# Patient Record
Sex: FEMALE | Race: WHITE | Employment: PART TIME | URBAN - METROPOLITAN AREA
[De-identification: names, ages, dates, MRNs, and addresses within clinical notes are randomized per-mention and may not be internally consistent; named-entity substitution may affect disease eponyms.]

---

## 2017-11-13 ENCOUNTER — HOSPITAL ENCOUNTER (OUTPATIENT)
Dept: VASCULAR SURGERY | Age: 62
Discharge: HOME | End: 2017-11-13
Attending: NURSE PRACTITIONER
Payer: COMMERCIAL

## 2017-11-13 DIAGNOSIS — M25.561 RIGHT KNEE PAIN: ICD-10-CM

## 2017-11-13 DIAGNOSIS — Z92.23 HISTORY OF ESTROGEN THERAPY: ICD-10-CM

## 2017-11-13 DIAGNOSIS — I82.90 THROMBUS: ICD-10-CM

## 2017-11-13 PROCEDURE — 93971 EXTREMITY STUDY: CPT

## 2017-11-14 NOTE — PROCEDURES
Abdulkadir  *** FINAL REPORT ***    Name: Satniago Wesley  MRN: [id number]    Outpatient  : 30 Oct 1955  HIS Order #: [id number]  [id number] [facility] Visit #: [id number]  Date: 2017    TYPE OF TEST: Peripheral Venous Testing    REASON FOR TEST  Pain in limb    Right Leg:-  Deep venous thrombosis:           No  Superficial venous thrombosis:    No  Deep venous insufficiency:        No  Superficial venous insufficiency: No      INTERPRETATION/FINDINGS  PROCEDURE:  RIGHT LOWER EXTREMITY  VENOUS DUPLEX. Evaluation of lower  extremity veins with ultrasound (B-mode imaging, pulsed Doppler, color   Doppler). Includes the common femoral, deep femoral, femoral,  popliteal, posterior tibial, peroneal, and great saphenous veins. Other veins, for example the gastrocnemius and soleal veins, may also  be visualized. FINDINGS: [place] Rhody scale and color flow duplex images of the veins in the  right lower extremity demonstrate normal compressibility, spontaneous  and augmented flow profiles, and absence of filling defects throughout   the deep and superficial veins in the right lower extremity. CONCLUSION: Right lower extremity venous duplex negative for deep  venous thrombosis or thrombophlebitis. Left common femoral vein is  thrombus free. NOTE: Avascular structure seen at the right popliteal  fosa measuring 3.15 x 1.29 cm consistent with a baker's cyst. Enlarged   lymphnode seen in the right groin measuring 0.58 x 2.09 cm. ADDITIONAL COMMENTS    I have personally reviewed the data relevant to the interpretation of  this  study. TECHNOLOGIST: Vannessa Foster. Watson  Signed: 2017 9:48:06 PM    PHYSICIAN: Hailee Rudolph.  Paola Barajas MD  Signed: 2017 10:15:11 AM

## 2018-01-22 ENCOUNTER — HOSPITAL ENCOUNTER (OUTPATIENT)
Dept: MAMMOGRAPHY | Age: 63
Discharge: HOME | End: 2018-01-22
Attending: FAMILY MEDICINE
Payer: COMMERCIAL

## 2018-01-22 DIAGNOSIS — M85.80 OSTEOPENIA: ICD-10-CM

## 2018-01-22 PROCEDURE — 77080 DXA BONE DENSITY AXIAL: CPT

## 2022-08-23 ENCOUNTER — OFFICE VISIT (OUTPATIENT)
Dept: ORTHOPEDIC SURGERY | Age: 67
End: 2022-08-23
Payer: MEDICARE

## 2022-08-23 VITALS — HEIGHT: 68 IN | WEIGHT: 240 LBS | BODY MASS INDEX: 36.37 KG/M2

## 2022-08-23 DIAGNOSIS — M25.562 KNEE MENISCUS PAIN, LEFT: ICD-10-CM

## 2022-08-23 DIAGNOSIS — S83.242A TEAR OF MEDIAL MENISCUS OF LEFT KNEE, INITIAL ENCOUNTER: ICD-10-CM

## 2022-08-23 DIAGNOSIS — M25.562 CHRONIC PAIN OF LEFT KNEE: Primary | ICD-10-CM

## 2022-08-23 DIAGNOSIS — M25.462 KNEE EFFUSION, LEFT: ICD-10-CM

## 2022-08-23 DIAGNOSIS — M25.562 MEDIAL KNEE PAIN, LEFT: ICD-10-CM

## 2022-08-23 DIAGNOSIS — G89.29 CHRONIC PAIN OF LEFT KNEE: Primary | ICD-10-CM

## 2022-08-23 PROCEDURE — G8536 NO DOC ELDER MAL SCRN: HCPCS | Performed by: ORTHOPAEDIC SURGERY

## 2022-08-23 PROCEDURE — 1090F PRES/ABSN URINE INCON ASSESS: CPT | Performed by: ORTHOPAEDIC SURGERY

## 2022-08-23 PROCEDURE — 99214 OFFICE O/P EST MOD 30 MIN: CPT | Performed by: ORTHOPAEDIC SURGERY

## 2022-08-23 PROCEDURE — 3017F COLORECTAL CA SCREEN DOC REV: CPT | Performed by: ORTHOPAEDIC SURGERY

## 2022-08-23 PROCEDURE — G8427 DOCREV CUR MEDS BY ELIG CLIN: HCPCS | Performed by: ORTHOPAEDIC SURGERY

## 2022-08-23 PROCEDURE — 1101F PT FALLS ASSESS-DOCD LE1/YR: CPT | Performed by: ORTHOPAEDIC SURGERY

## 2022-08-23 PROCEDURE — 20610 DRAIN/INJ JOINT/BURSA W/O US: CPT | Performed by: ORTHOPAEDIC SURGERY

## 2022-08-23 PROCEDURE — G8399 PT W/DXA RESULTS DOCUMENT: HCPCS | Performed by: ORTHOPAEDIC SURGERY

## 2022-08-23 PROCEDURE — G8432 DEP SCR NOT DOC, RNG: HCPCS | Performed by: ORTHOPAEDIC SURGERY

## 2022-08-23 PROCEDURE — 1123F ACP DISCUSS/DSCN MKR DOCD: CPT | Performed by: ORTHOPAEDIC SURGERY

## 2022-08-23 PROCEDURE — G8419 CALC BMI OUT NRM PARAM NOF/U: HCPCS | Performed by: ORTHOPAEDIC SURGERY

## 2022-08-23 RX ORDER — BUPIVACAINE HYDROCHLORIDE 7.5 MG/ML
2 INJECTION, SOLUTION EPIDURAL; RETROBULBAR ONCE
Status: COMPLETED | OUTPATIENT
Start: 2022-08-23 | End: 2022-08-23

## 2022-08-23 RX ORDER — TRIAMCINOLONE ACETONIDE 40 MG/ML
80 INJECTION, SUSPENSION INTRA-ARTICULAR; INTRAMUSCULAR ONCE
Status: COMPLETED | OUTPATIENT
Start: 2022-08-23 | End: 2022-08-23

## 2022-08-23 RX ADMIN — TRIAMCINOLONE ACETONIDE 80 MG: 40 INJECTION, SUSPENSION INTRA-ARTICULAR; INTRAMUSCULAR at 09:29

## 2022-08-23 RX ADMIN — BUPIVACAINE HYDROCHLORIDE 15 MG: 7.5 INJECTION, SOLUTION EPIDURAL; RETROBULBAR at 09:29

## 2022-08-23 NOTE — PROGRESS NOTES
Jesus Floyd (: 1955) is a 73 y.o. female, patient, here for evaluation of the following chief complaint(s):  Knee Pain (Left, pain since 2022)       HPI:    She began having increased left knee pain 4 weeks ago. The patient reports no specific injury but does state that she started feeling a catching sensation while walking. She describes her left knee pain now is moderate to severe, sharp, dull, throbbing, aching, and intermittent. Her left knee pain does make her difficult for her to go to sleep and does wake her up from sleep. The patient states that her left knee pain continues to get worse. She reports walking, stairs, and lying in bed make her pain worse and rest, heat, ice, and elevation all makes her pain better. The patient has been taking Tylenol and using a Voltaren gel. She was not seen in the emergency room for left knee pain and reports no previous or related left knee surgery. She was seen in an Urgent Care facility prior to her visit today and they did perform x-rays on her left knee which showed no evidence of a fracture or dislocation. There is evidence of severe medial compartment osteoarthritis of the right knee with subchondral sclerosis. Allergies   Allergen Reactions    Aspirin Swelling    Sulfa (Sulfonamide Antibiotics) Hives       Current Outpatient Medications   Medication Sig    valsartan-hydrochlorothiazide (DIOVAN-HCT) 320-25 mg per tablet Take 1 Tab by mouth daily. FLUOXETINE HCL (FLUOXETINE PO) Take  by mouth.    multivitamin (ONE A DAY) tablet Take 1 Tab by mouth daily. No current facility-administered medications for this visit.        Past Medical History:   Diagnosis Date    Essential hypertension         Past Surgical History:   Procedure Laterality Date    HX BLADDER SUSPENSION      HX  SECTION         Family History   Problem Relation Age of Onset    Diabetes Mother     No Known Problems Father         Social History Socioeconomic History    Marital status:      Spouse name: Not on file    Number of children: Not on file    Years of education: Not on file    Highest education level: Not on file   Occupational History    Not on file   Tobacco Use    Smoking status: Never    Smokeless tobacco: Never   Substance and Sexual Activity    Alcohol use: No    Drug use: Not on file    Sexual activity: Not on file   Other Topics Concern    Not on file   Social History Narrative    Not on file     Social Determinants of Health     Financial Resource Strain: Not on file   Food Insecurity: Not on file   Transportation Needs: Not on file   Physical Activity: Not on file   Stress: Not on file   Social Connections: Not on file   Intimate Partner Violence: Not on file   Housing Stability: Not on file       Review of Systems   All other systems reviewed and are negative. Vitals:  Ht 5' 8\" (1.727 m)   Wt 240 lb (108.9 kg)   BMI 36.49 kg/m²    Body mass index is 36.49 kg/m². Ortho Exam     The patient is well-developed and well-nourished. The patient presents today in alert and oriented x3 with a normal mood and affect. The patient stands with a normal weightbearing line but walks with a slightly antalgic gait because of her left knee pain. Left knee is tender to palpation along the medial joint line and has moderate size effusion. The patient has positive Norma´s test and the knee is stable. There is a lack full flexion secondary to the effusion but does have full extension. There is 5/5 strength. The knee and lower extremity is neurovascularly intact. There is no erythema or warmth the skin is normal.    ASSESSMENT/PLAN:      1. Chronic pain of left knee  -     MRI KNEE LT WO CONT; Future  2. Medial knee pain, left  3. Knee meniscus pain, left  4.  Tear of medial meniscus of left knee, initial encounter  -     bupivacaine (PF) (MARCAINE) 0.75 % (7.5 mg/mL) injection 15 mg; 15 mg (2 mL), Intra artICUlar, ONCE, 1 dose, On Tue 8/23/22 at 1000  -     triamcinolone acetonide (KENALOG-40) 40 mg/mL injection 80 mg; 80 mg, Intra artICUlar, ONCE, 1 dose, On Tue 8/23/22 at 1000  5. Knee effusion, left     Below is the assessment and plan developed based on review of pertinent history, physical exam, labs, studies, and medications. We discussed the patient's left knee pain and her signs, symptoms, physical exam, description of her pain, and x-rays performed at an outside facility that were independently reviewed today are consistent with a medial meniscus tear and an effusion. The possible treatment options were discussed with the patient and because of an effusion present we elected to aspirate and inject her left knee with cortisone today to try and alleviate some of her discomfort. The risks and benefits of the aspiration and injection were discussed in detail with the patient and under sterile prep the patient's left knee was aspirated of approximately 40 ccs of straw-colored synovial fluid and injected with 2 ccs of 40 mg/cc of Kenalog and 2 ccs of 0.75% Sensorcaine. She tolerated both the aspiration and injection well. Also because of the over 4-week long duration of her increased pain, no improvement with multiple modalities of conservative management including an at-home exercise program, her physical exam, description of her pain, independently reviewed outside x-rays, and her inability to complete daily living activities without significant discomfort we elected to obtain an MRI of her left knee to further evaluate the severity of her medial meniscus tear and causation for her effusion. The MRI images and results will be used in preoperative planning if and almost when surgical intervention is necessary. The risks and benefits of the MRI were discussed in detail with the patient and she would like to proceed. We will schedule this at her convenience.   I will see her back after MRI is complete to discuss the images, results, and further treatment options. In the interim, I did encourage her to ice and elevate when possible, modify her activity level based on her left knee pain, monitor her effusion, and use anti-inflammatory medication when necessary. The patient will also work on range of motion, strengthening, and stretching exercises with an at-home exercise program as pain tolerates. She is to avoid any deep knee bend activities against resistance, squatting, kneeling, stairs, lunging, cutting, twisting, change of direction, and high impact loading activities. I will see her back as noted above after her left knee MRI is complete. **We will obtain an MRI for more information to determine the best treatment plan moving forward and help us prepare for surgical intervention if necessary. **    Return for After her left knee MRI is complete. An electronic signature was used to authenticate this note.   -- Gela Kc MD

## 2022-08-24 ENCOUNTER — HOSPITAL ENCOUNTER (OUTPATIENT)
Dept: MRI IMAGING | Age: 67
Discharge: HOME | End: 2022-08-24
Attending: ORTHOPAEDIC SURGERY
Payer: MEDICARE

## 2022-08-24 DIAGNOSIS — M25.562 CHRONIC PAIN OF LEFT KNEE: ICD-10-CM

## 2022-08-24 DIAGNOSIS — G89.29 CHRONIC PAIN OF LEFT KNEE: ICD-10-CM

## 2022-08-24 PROCEDURE — 73721 MRI JNT OF LWR EXTRE W/O DYE: CPT

## 2022-08-26 ENCOUNTER — OFFICE VISIT (OUTPATIENT)
Dept: ORTHOPEDIC SURGERY | Age: 67
End: 2022-08-26
Payer: MEDICARE

## 2022-08-26 ENCOUNTER — OFFICE VISIT (OUTPATIENT)
Dept: ORTHOPEDIC SURGERY | Age: 67
End: 2022-08-26

## 2022-08-26 VITALS — WEIGHT: 240 LBS | BODY MASS INDEX: 36.37 KG/M2 | HEIGHT: 68 IN

## 2022-08-26 DIAGNOSIS — M25.562 CHRONIC PAIN OF LEFT KNEE: Primary | ICD-10-CM

## 2022-08-26 DIAGNOSIS — G89.29 CHRONIC PAIN OF LEFT KNEE: Primary | ICD-10-CM

## 2022-08-26 DIAGNOSIS — S83.232D COMPLEX TEAR OF MEDIAL MENISCUS OF LEFT KNEE, SUBSEQUENT ENCOUNTER: ICD-10-CM

## 2022-08-26 DIAGNOSIS — M94.262 CHONDROMALACIA OF LEFT KNEE: ICD-10-CM

## 2022-08-26 DIAGNOSIS — M17.12 PRIMARY OSTEOARTHRITIS OF LEFT KNEE: Primary | ICD-10-CM

## 2022-08-26 DIAGNOSIS — S83.272D COMPLEX TEAR OF LATERAL MENISCUS OF LEFT KNEE, SUBSEQUENT ENCOUNTER: ICD-10-CM

## 2022-08-26 PROCEDURE — 20610 DRAIN/INJ JOINT/BURSA W/O US: CPT | Performed by: ORTHOPAEDIC SURGERY

## 2022-08-26 PROCEDURE — G8399 PT W/DXA RESULTS DOCUMENT: HCPCS | Performed by: ORTHOPAEDIC SURGERY

## 2022-08-26 PROCEDURE — G8427 DOCREV CUR MEDS BY ELIG CLIN: HCPCS | Performed by: ORTHOPAEDIC SURGERY

## 2022-08-26 PROCEDURE — 1090F PRES/ABSN URINE INCON ASSESS: CPT | Performed by: ORTHOPAEDIC SURGERY

## 2022-08-26 PROCEDURE — 99213 OFFICE O/P EST LOW 20 MIN: CPT | Performed by: ORTHOPAEDIC SURGERY

## 2022-08-26 PROCEDURE — G8536 NO DOC ELDER MAL SCRN: HCPCS | Performed by: ORTHOPAEDIC SURGERY

## 2022-08-26 PROCEDURE — 1123F ACP DISCUSS/DSCN MKR DOCD: CPT | Performed by: ORTHOPAEDIC SURGERY

## 2022-08-26 PROCEDURE — G8432 DEP SCR NOT DOC, RNG: HCPCS | Performed by: ORTHOPAEDIC SURGERY

## 2022-08-26 PROCEDURE — 1101F PT FALLS ASSESS-DOCD LE1/YR: CPT | Performed by: ORTHOPAEDIC SURGERY

## 2022-08-26 PROCEDURE — 3017F COLORECTAL CA SCREEN DOC REV: CPT | Performed by: ORTHOPAEDIC SURGERY

## 2022-08-26 PROCEDURE — G8417 CALC BMI ABV UP PARAM F/U: HCPCS | Performed by: ORTHOPAEDIC SURGERY

## 2022-08-26 RX ORDER — TRIAMCINOLONE ACETONIDE 40 MG/ML
40 INJECTION, SUSPENSION INTRA-ARTICULAR; INTRAMUSCULAR ONCE
Status: COMPLETED | OUTPATIENT
Start: 2022-08-26 | End: 2022-08-26

## 2022-08-26 RX ORDER — LIDOCAINE HYDROCHLORIDE 10 MG/ML
2 INJECTION INFILTRATION; PERINEURAL ONCE
Status: COMPLETED | OUTPATIENT
Start: 2022-08-26 | End: 2022-08-26

## 2022-08-26 RX ORDER — AMLODIPINE BESYLATE 5 MG/1
TABLET ORAL
COMMUNITY
Start: 2022-07-18

## 2022-08-26 RX ADMIN — TRIAMCINOLONE ACETONIDE 40 MG: 40 INJECTION, SUSPENSION INTRA-ARTICULAR; INTRAMUSCULAR at 14:18

## 2022-08-26 RX ADMIN — LIDOCAINE HYDROCHLORIDE 2 ML: 10 INJECTION INFILTRATION; PERINEURAL at 14:18

## 2022-08-26 NOTE — PROGRESS NOTES
Bernadine Stack (: 1955) is a 73 y.o. female, patient, here for evaluation of the following chief complaint(s):  Knee Pain (Left, mri results)       HPI:    She was last seen for left knee pain on 2022. Since then, the patient did have an MRI performed on her left knee on 2022. The patient states that her pain level is the same as it was at her last visit. She rates the severity of her left knee pain as a 6 out of 10. She describes her pain as dull, aching, and constant. Her left knee pain does make it difficult for her to go to sleep and does wake her up from sleep. She has been experiencing some weakness in her left knee. The patient has been taking anti-inflammatory medication for discomfort. The patient did have her left knee injected with cortisone at her last visit and reports little to no pain relief. Left knee MRI images and results were independently reviewed and they were consistent with complex tearing the posterior horn of the posterior body of the medial meniscus with loss of meniscal hoop tensile integrity. Complex tearing of the body of the lateral meniscus with loss of meniscal hoop tensile integrity. Moderate grade 1 tendinopathy of the semimembranosus. Intermediate grade chondral derangement extensively shown within the anterior weightbearing portions of the medial femoral condyle with peripheral femoral and tibial subchondral stress reaction. Small focus of intermediate grade derangement at the mid weightbearing lateral femoral condyle. Foci of patellofemoral grade 2 derangement also noted. Allergies   Allergen Reactions    Aspirin Swelling    Sulfa (Sulfonamide Antibiotics) Hives       Current Outpatient Medications   Medication Sig    valsartan-hydrochlorothiazide (DIOVAN-HCT) 320-25 mg per tablet Take 1 Tab by mouth daily. FLUOXETINE HCL (FLUOXETINE PO) Take  by mouth.    multivitamin (ONE A DAY) tablet Take 1 Tab by mouth daily.      No current facility-administered medications for this visit. Past Medical History:   Diagnosis Date    Essential hypertension         Past Surgical History:   Procedure Laterality Date    HX BLADDER SUSPENSION      HX  SECTION         Family History   Problem Relation Age of Onset    Diabetes Mother     No Known Problems Father         Social History     Socioeconomic History    Marital status:      Spouse name: Not on file    Number of children: Not on file    Years of education: Not on file    Highest education level: Not on file   Occupational History    Not on file   Tobacco Use    Smoking status: Never    Smokeless tobacco: Never   Substance and Sexual Activity    Alcohol use: No    Drug use: Not on file    Sexual activity: Not on file   Other Topics Concern    Not on file   Social History Narrative    Not on file     Social Determinants of Health     Financial Resource Strain: Not on file   Food Insecurity: Not on file   Transportation Needs: Not on file   Physical Activity: Not on file   Stress: Not on file   Social Connections: Not on file   Intimate Partner Violence: Not on file   Housing Stability: Not on file       Review of Systems   All other systems reviewed and are negative. Vitals: There were no vitals taken for this visit. There is no height or weight on file to calculate BMI. Ortho Exam     The patient is well-developed and well-nourished. The patient presents today in alert and oriented x3 with a normal mood and affect. The patient stands with a normal weightbearing line but walks with a slightly antalgic gait because of her left knee pain. Left knee is tender to palpation along the medial, lateral, and patellofemoral joint lines with a moderate size effusion. The patient has positive Norma´s test and the knee is stable. They have crepitus of the patellofemoral joint with range of motion and discomfort with patella grind testing.    There is a lack full flexion secondary to the effusion but does have full extension. There is 5/5 strength. The knee and lower extremity is neurovascularly intact. There is no erythema or warmth the skin is normal.      ASSESSMENT/PLAN:      1. Chronic pain of left knee  2. Complex tear of lateral meniscus of left knee, subsequent encounter  3. Complex tear of medial meniscus of left knee, subsequent encounter  4. Chondromalacia of left knee     Below is the assessment and plan developed based on review of pertinent history, physical exam, labs, studies, and medications. We discussed the patient's left knee pain and we independently reviewed her MRI images and results and they were consistent with complex tearing the posterior horn of the posterior body of the medial meniscus with loss of meniscal hoop tensile integrity. Complex tearing of the body of the lateral meniscus with loss of meniscal hoop tensile integrity. Moderate grade 1 tendinopathy of the semimembranosus. Intermediate grade chondral derangement extensively shown within the anterior weightbearing portions of the medial femoral condyle with peripheral femoral and tibial subchondral stress reaction. Small focus of intermediate grade derangement at the mid weightbearing lateral femoral condyle. Foci of patellofemoral grade 2 derangement also noted. The possible treatment options were discussed with the patient and because of the significance and severity of her chondral derangement and chondromalacia we elected to refer her to one of our total knee specialist for further evaluation. We will refer to one of their treatment plans moving forward. In the interim, I did encourage her to ice and elevate when possible, modify her activity level based on her left knee pain, and use anti-inflammatory medication when necessary. The patient will work on range of motion, strengthening, and stretching exercises with an at-home exercise program if pain tolerates.   She is to avoid any deep knee bend activities against resistance, squatting, kneeling, stairs, lunging, cutting, twisting, change of direction, and high impact loading activities. I will see her back on an as-needed basis for left knee pain and as noted above refer to one of our total knee specialist treatment plan moving forward. Return for Follow up with one of our total knee specialists. An electronic signature was used to authenticate this note.   -- Adry Thomson MD

## 2022-08-26 NOTE — LETTER
8/26/2022    Patient: Murray Barnett   YOB: 1955   Date of Visit: 8/26/2022     Kandace Blanco MD  [address] [facility]  [address] [address] [facility]  Via Fax: [phone]    Dear Kandace Blanco MD,      Thank you for referring Ms. Murray Barnett to [facility] for evaluation. My notes for this consultation are attached. If you have questions, please do not hesitate to call me. I look forward to following your patient along with you.       Sincerely,    Mohsen Daniels MD

## 2022-08-26 NOTE — PROGRESS NOTES
Moi Slater (: 1955) is a 73 y.o. female, patient, here for evaluation of the following chief complaint(s):  Knee Pain (Left knee pain - referred by Dr. Dickie Lanes )           Allergies   Allergen Reactions    Aspirin Swelling    Sulfa (Sulfonamide Antibiotics) Hives       Current Outpatient Medications   Medication Sig    amLODIPine (NORVASC) 5 mg tablet     valsartan-hydrochlorothiazide (DIOVAN-HCT) 320-25 mg per tablet Take 1 Tab by mouth daily. FLUOXETINE HCL (FLUOXETINE PO) Take  by mouth.    multivitamin (ONE A DAY) tablet Take 1 Tab by mouth daily. No current facility-administered medications for this visit.        Past Medical History:   Diagnosis Date    Essential hypertension         Past Surgical History:   Procedure Laterality Date    HX BLADDER SUSPENSION      HX  SECTION         Family History   Problem Relation Age of Onset    Diabetes Mother     No Known Problems Father         Social History     Socioeconomic History    Marital status:      Spouse name: Not on file    Number of children: Not on file    Years of education: Not on file    Highest education level: Not on file   Occupational History    Not on file   Tobacco Use    Smoking status: Never    Smokeless tobacco: Never   Substance and Sexual Activity    Alcohol use: No    Drug use: Not on file    Sexual activity: Not on file   Other Topics Concern    Not on file   Social History Narrative    Not on file     Social Determinants of Health     Financial Resource Strain: Not on file   Food Insecurity: Not on file   Transportation Needs: Not on file   Physical Activity: Not on file   Stress: Not on file   Social Connections: Not on file   Intimate Partner Violence: Not on file   Housing Stability: Not on file       ROS    Positive for: Musculoskeletal  Last edited by Man Oneal on 2022  1:32 PM.            Vitals:  Ht 5' 8\" (1.727 m)   Wt 240 lb (108.9 kg)   BMI 36.49 kg/m²    Body mass index is 36.49 kg/m². IMAGING:  XR Results (most recent):  Results from Appointment encounter on 22    XR KNEES BI STAND    Narrative  Repeated standing x-ray. The left knee is now nearly bone-on-bone pretty much in the medial compartment. Much different than it was on her initial x-rays. MRI confirms this finding. Kathy Mills (: 1955) is a 73 y.o. female, patient, here for evaluation of the following chief complaint(s):  Knee Pain (Left knee pain - referred by Dr. Anderson )       HPI:    Patient is here today chief complaint left knee pain. Seems to localize to the medial joint line. Recently seen by Dr. Anderson. She was injected on Tuesday. She had an injection about 3 weeks prior at [place]. She did have an MRI which showed degenerative changes in her medial compartment including subchondral edema degenerative meniscus tearing and full-thickness cartilage loss on the femur and the tibia. Allergies   Allergen Reactions    Aspirin Swelling    Sulfa (Sulfonamide Antibiotics) Hives       Current Outpatient Medications   Medication Sig    amLODIPine (NORVASC) 5 mg tablet     valsartan-hydrochlorothiazide (DIOVAN-HCT) 320-25 mg per tablet Take 1 Tab by mouth daily. FLUOXETINE HCL (FLUOXETINE PO) Take  by mouth.    multivitamin (ONE A DAY) tablet Take 1 Tab by mouth daily. No current facility-administered medications for this visit.        Past Medical History:   Diagnosis Date    Essential hypertension         Past Surgical History:   Procedure Laterality Date    HX BLADDER SUSPENSION      HX  SECTION         Family History   Problem Relation Age of Onset    Diabetes Mother     No Known Problems Father         Social History     Socioeconomic History    Marital status:      Spouse name: Not on file    Number of children: Not on file    Years of education: Not on file    Highest education level: Not on file   Occupational History    Not on file   Tobacco Use    Smoking status: Never    Smokeless tobacco: Never   Substance and Sexual Activity    Alcohol use: No    Drug use: Not on file    Sexual activity: Not on file   Other Topics Concern    Not on file   Social History Narrative    Not on file     Social Determinants of Health     Financial Resource Strain: Not on file   Food Insecurity: Not on file   Transportation Needs: Not on file   Physical Activity: Not on file   Stress: Not on file   Social Connections: Not on file   Intimate Partner Violence: Not on file   Housing Stability: Not on file       ROS    Positive for: Musculoskeletal  Last edited by Fito Enriquez on 2022  1:32 PM.            Vitals:  Ht 5' 8\" (1.727 m)   Wt 240 lb (108.9 kg)   BMI 36.49 kg/m²    Body mass index is 36.49 kg/m². PHYSICAL EXAM:  Exam today patient has mild effusion. Her left hip is painless. She has no knee instability. Pain is present along medial joint line mainly medial tibia. Norma's testing was negative but did elicit some pain. ASSESSMENT/PLAN:  1. Primary osteoarthritis of left knee  -     REFERRAL TO PHYSICAL THERAPY  -     XR KNEES BI STAND; Future  -     triamcinolone acetonide (KENALOG-40) 40 mg/mL injection 40 mg; 40 mg, Intra artICUlar, ONCE, 1 dose, On 22 at 1500  -     lidocaine (XYLOCAINE) 10 mg/mL (1 %) injection 2 mL; 2 mL, Intra artICUlar, ONCE, 1 dose, On 22 at [address]50 [facility] (: 1955) is a 73 y.o. female, patient, here for evaluation of the following chief complaint(s):  Knee Pain (Left knee pain - referred by Dr. Anderson )           Allergies   Allergen Reactions    Aspirin Swelling    Sulfa (Sulfonamide Antibiotics) Hives       Current Outpatient Medications   Medication Sig    amLODIPine (NORVASC) 5 mg tablet     valsartan-hydrochlorothiazide (DIOVAN-HCT) 320-25 mg per tablet Take 1 Tab by mouth daily.     FLUOXETINE HCL (FLUOXETINE PO) Take  by mouth.    multivitamin (ONE A DAY) tablet Take 1 Tab by mouth daily. No current facility-administered medications for this visit. Past Medical History:   Diagnosis Date    Essential hypertension         Past Surgical History:   Procedure Laterality Date    HX BLADDER SUSPENSION      HX  SECTION         Family History   Problem Relation Age of Onset    Diabetes Mother     No Known Problems Father         Social History     Socioeconomic History    Marital status:      Spouse name: Not on file    Number of children: Not on file    Years of education: Not on file    Highest education level: Not on file   Occupational History    Not on file   Tobacco Use    Smoking status: Never    Smokeless tobacco: Never   Substance and Sexual Activity    Alcohol use: No    Drug use: Not on file    Sexual activity: Not on file   Other Topics Concern    Not on file   Social History Narrative    Not on file     Social Determinants of Health     Financial Resource Strain: Not on file   Food Insecurity: Not on file   Transportation Needs: Not on file   Physical Activity: Not on file   Stress: Not on file   Social Connections: Not on file   Intimate Partner Violence: Not on file   Housing Stability: Not on file       ROS    Positive for: Musculoskeletal  Last edited by Blair Greer on 2022  1:32 PM.            Vitals:  Ht 5' 8\" (1.727 m)   Wt 240 lb (108.9 kg)   BMI 36.49 kg/m²    Body mass index is 36.49 kg/m². ASSESSMENT/PLAN:  1. Primary osteoarthritis of left knee  -     REFERRAL TO PHYSICAL THERAPY  -     XR KNEES BI STAND; Future  -     triamcinolone acetonide (KENALOG-40) 40 mg/mL injection 40 mg; 40 mg, Intra artICUlar, ONCE, 1 dose, On 22 at 1500  -     lidocaine (XYLOCAINE) 10 mg/mL (1 %) injection 2 mL; 2 mL, Intra artICUlar, ONCE, 1 dose, On 22 at 1500    Patient is diagnosis left knee osteoarthritis.   I agree with Dr. Anderson and that arthroscopic surgery would have somewhat limited benefit in this case.  She is nearly or pretty much bone-on-bone in the medial compartment of the left knee. The unusual thing is that her right knee is completely bone-on-bone with severe DJD and it does not bother her at all. To treat her as if she has degenerative meniscus tear at this stage. I reinjected her in the medial compartment directly and started her on some physical therapy. I would give this an additional 4 to 6 weeks to settle down. If she continues to have symptoms unrelieved by conservative treatment then she is a candidate for either a medial unicompartmental arthroplasty or more likely a total knee due to the some changes in her lateral compartment on MRI. Discussed risks/benefits of cortisone injection and patient gave verbal consent. Under sterile conditions, the left knee 1 cc 40 mg/cc Kenalog was injected with  2cc 1% Lidocaine and 1 cc 40 mg/cc Kenalog intra-articularly, tolerated the procedure well. An electronic signature was used to authenticate this note. --Trent James MD                An electronic signature was used to authenticate this note.   --Trent James MD

## 2024-10-23 ENCOUNTER — HOSPITAL ENCOUNTER (OUTPATIENT)
Facility: HOSPITAL | Age: 69
Discharge: HOME | End: 2024-10-26
Payer: MEDICARE

## 2024-10-23 DIAGNOSIS — M85.89 OSTEOPENIA OF MULTIPLE SITES: ICD-10-CM

## 2024-10-23 DIAGNOSIS — K76.0 FATTY LIVER: ICD-10-CM

## 2024-10-23 DIAGNOSIS — E78.49 OTHER HYPERLIPIDEMIA: ICD-10-CM

## 2024-10-23 PROCEDURE — 75571 CT HRT W/O DYE W/CA TEST: CPT

## 2024-10-23 PROCEDURE — 77080 DXA BONE DENSITY AXIAL: CPT
